# Patient Record
(demographics unavailable — no encounter records)

---

## 2025-05-09 NOTE — REVIEW OF SYSTEMS
[Cough] : cough [Chest Tightness] : chest tightness [Sputum] : sputum [Wheezing] : wheezing [Negative] : Endocrine

## 2025-05-09 NOTE — REASON FOR VISIT
[Initial] : an initial visit [Asthma] : asthma [Dysphagia] : dysphagia [Shortness of Breath] : shortness of breath [Wheezing] : wheezing

## 2025-05-09 NOTE — HISTORY OF PRESENT ILLNESS
[TextBox_4] : 31 yo M with PMHx of HTN, Asthma since childhood has been on flovent for 3-4 years, Advair at one time, was seen in Urgent care 2 weeks ago for chest pain, productive cough and wheezing was prescribed prednisone for 5 days provided minimal relief, returned to ED on 5/8/25 for worsening cough, needing is albuterol every 2 hours, was discharged with advair 100mcg and 20mg prednisone for 3 days, which he has not picked up yet, patient states his asthma flares during the spring is on flonase, azelestine, zyrtec, claritin.   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

## 2025-05-09 NOTE — PHYSICAL EXAM
[No Acute Distress] : no acute distress [Normal Oropharynx] : normal oropharynx [Normal Appearance] : normal appearance [No Neck Mass] : no neck mass [Normal Rate/Rhythm] : normal rate/rhythm [Normal S1, S2] : normal s1, s2 [No Murmurs] : no murmurs [No Resp Distress] : no resp distress [Clear to Auscultation Bilaterally] : clear to auscultation bilaterally [No Abnormalities] : no abnormalities [Benign] : benign [Normal Gait] : normal gait [No Clubbing] : no clubbing [No Cyanosis] : no cyanosis [No Edema] : no edema [FROM] : FROM [Normal Color/ Pigmentation] : normal color/ pigmentation [No Focal Deficits] : no focal deficits [Oriented x3] : oriented x3 [Normal Affect] : normal affect [TextBox_68] : bilateral apical wheezing

## 2025-05-23 NOTE — HISTORY OF PRESENT ILLNESS
[TextBox_4] : 5/23/25: Patient endorses compliance with Symbicort, since use wheezing has resolved and cough much improved, last reached for Albuterol last Wednesday for slight wheeze, patient states overall he feels much improved, leaving for family trip to [facility] 6/3 no return ticket yet but possibly first week of July, will defer bloodwork and PFTs until then.    5/9/25: 31 yo M with PMHx of HTN, Asthma since childhood has been on flovent for 3-4 years, Advair at one time, was seen in Urgent care 2 weeks ago for chest pain, productive cough and wheezing was prescribed prednisone for 5 days provided minimal relief, returned to ED on 5/8/25 for worsening cough, needing is albuterol every 2 hours, was discharged with advair 100mcg and 20mg prednisone for 3 days, which he has not picked up yet, patient states his asthma flares during the spring is on flonase, azelestine, zyrtec, claritin.   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

## 2025-05-23 NOTE — REVIEW OF SYSTEMS
[Cough] : cough [Chest Tightness] : no chest tightness [Sputum] : no sputum [Wheezing] : no wheezing [Negative] : Endocrine

## 2025-07-25 NOTE — REVIEW OF SYSTEMS
[Negative] : Endocrine [Cough] : no cough [Chest Tightness] : no chest tightness [Sputum] : no sputum [Wheezing] : no wheezing

## 2025-07-25 NOTE — HISTORY OF PRESENT ILLNESS
[TextBox_4] : 7/25/25: Patient returned from [facility], has been feeling well, compliant with symbicort, denies respiratory complaints, also endorse HO of severe DAISY diagnosed 2023 via [facility] intolerant with PAP therapy.   5/23/25: Patient endorses compliance with Symbicort, since use wheezing has resolved and cough much improved, last reached for Albuterol last Wednesday for slight wheeze, patient states overall he feels much improved, leaving for family trip to [facility] 6/3 no return ticket yet but possibly first week of July, will defer bloodwork and PFTs until then.    5/9/25: 33 yo M with PMHx of HTN, Asthma since childhood has been on flovent for 3-4 years, Advair at one time, was seen in Urgent care 2 weeks ago for chest pain, productive cough and wheezing was prescribed prednisone for 5 days provided minimal relief, returned to ED on 5/8/25 for worsening cough, needing is albuterol every 2 hours, was discharged with advair 100mcg and 20mg prednisone for 3 days, which he has not picked up yet, patient states his asthma flares during the spring is on flonase, azelestine, zyrtec, claritin.   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

## 2025-07-25 NOTE — REASON FOR VISIT
[Asthma] : asthma [Shortness of Breath] : shortness of breath [Wheezing] : wheezing [Follow-Up] : a follow-up visit [Sleep Apnea] : sleep apnea

## 2025-07-25 NOTE — PHYSICAL EXAM
[No Acute Distress] : no acute distress [Normal Oropharynx] : normal oropharynx [Normal Appearance] : normal appearance [No Neck Mass] : no neck mass [Normal Rate/Rhythm] : normal rate/rhythm [Normal S1, S2] : normal s1, s2 [No Murmurs] : no murmurs [No Resp Distress] : no resp distress [Clear to Auscultation Bilaterally] : clear to auscultation bilaterally [No Abnormalities] : no abnormalities [Benign] : benign [Normal Gait] : normal gait [No Clubbing] : no clubbing [No Cyanosis] : no cyanosis [No Edema] : no edema [FROM] : FROM [Normal Color/ Pigmentation] : normal color/ pigmentation [No Focal Deficits] : no focal deficits [Oriented x3] : oriented x3 [Normal Affect] : normal affect [TextBox_68] : no wheezing